# Patient Record
Sex: MALE | Race: WHITE
[De-identification: names, ages, dates, MRNs, and addresses within clinical notes are randomized per-mention and may not be internally consistent; named-entity substitution may affect disease eponyms.]

---

## 2017-05-16 ENCOUNTER — HOSPITAL ENCOUNTER (OUTPATIENT)
Dept: HOSPITAL 45 - C.RDSM | Age: 52
Discharge: HOME | End: 2017-05-16
Attending: INTERNAL MEDICINE
Payer: COMMERCIAL

## 2017-05-16 DIAGNOSIS — M25.562: Primary | ICD-10-CM

## 2017-05-24 ENCOUNTER — HOSPITAL ENCOUNTER (OUTPATIENT)
Dept: HOSPITAL 45 - C.MRI | Age: 52
Discharge: HOME | End: 2017-05-24
Attending: INTERNAL MEDICINE
Payer: COMMERCIAL

## 2017-05-24 DIAGNOSIS — R93.7: ICD-10-CM

## 2017-05-24 DIAGNOSIS — R60.0: ICD-10-CM

## 2017-05-24 DIAGNOSIS — M25.462: ICD-10-CM

## 2017-05-24 DIAGNOSIS — M25.562: Primary | ICD-10-CM

## 2017-05-24 NOTE — DIAGNOSTIC IMAGING REPORT
MRI OF THE LEFT KNEE WITHOUT CONTRAST



CLINICAL HISTORY: Left knee pain.    



COMPARISON STUDY:  Left knee radiograph May 16, 2017.



TECHNIQUE: Utilizing a 1.5 Lima magnet and dedicated coil, multiplanar,

multiecho imaging of the left knee was performed without intravenous or

intraarticular contrast.



FINDINGS: Alignment of the left knee is anatomic. There is a small left knee

joint effusion. Extensor mechanism is intact. The anterior and posterior

cruciate ligaments are intact. The medial collateral ligament and lateral

collateral ligament complex are also intact. There is mild chondrosis within the

medial compartment. There is moderate subchondral signal abnormality within the

mid aspect of the trochlea with overlying chondral signal abnormality. There is

no fracture. No suspicious marrow replacement is present. Lateral meniscus is

intact. There is signal within the posterior horn of the medial meniscus which

likely extends to articular surface. This suggests a meniscal tear.



IMPRESSION:  



1. Increased signal within the posterior horn of the medial meniscus with

probable articular surface extension suggestive of a meniscal tear.



2. Subchondral edema with overlying cartilage thinning within the mid aspect of

the trochlea.



3. Mild chondrosis within the medial compartment.



4. Small left knee joint effusion.







Electronically signed by:  Maxi Rudolph M.D.

5/24/2017 5:31 PM



Dictated Date/Time:  5/24/2017 5:13 PM

## 2018-01-23 ENCOUNTER — HOSPITAL ENCOUNTER (OUTPATIENT)
Dept: HOSPITAL 45 - X.SURG | Age: 53
Discharge: HOME | End: 2018-01-23
Attending: ORTHOPAEDIC SURGERY
Payer: COMMERCIAL

## 2018-01-23 VITALS
BODY MASS INDEX: 22.78 KG/M2 | WEIGHT: 150.31 LBS | HEIGHT: 67.99 IN | BODY MASS INDEX: 22.78 KG/M2 | HEIGHT: 67.99 IN | WEIGHT: 150.31 LBS

## 2018-01-23 VITALS — HEART RATE: 64 BPM | SYSTOLIC BLOOD PRESSURE: 122 MMHG | OXYGEN SATURATION: 98 % | DIASTOLIC BLOOD PRESSURE: 71 MMHG

## 2018-01-23 VITALS — TEMPERATURE: 97.52 F

## 2018-01-23 DIAGNOSIS — Z82.49: ICD-10-CM

## 2018-01-23 DIAGNOSIS — Z98.890: ICD-10-CM

## 2018-01-23 DIAGNOSIS — Z98.818: ICD-10-CM

## 2018-01-23 DIAGNOSIS — M23.232: ICD-10-CM

## 2018-01-23 DIAGNOSIS — Z80.0: ICD-10-CM

## 2018-01-23 DIAGNOSIS — M17.12: Primary | ICD-10-CM

## 2018-01-23 NOTE — [facility] POST OPERATIVE BRIEF NOTE
Immediate Operative Summary


Operative Date


Jan 23, 2018.





Pre-Operative Diagnosis





Left Knee medial menisus tear, chondrosis patellofemoral joint/mfc





Post-Operative Diagnosis





same as preop





Procedure(s) Performed





Left Knee Arthroscopic Partial Medial Meniscectomy, Chondroplasty Patellar 


Femoral Joint, Chondroplasty Femoral Condyle





Surgeon


Dr. Souza





Assistant Surgeon(s)


YADIRA Nash





Estimated Blood Loss


trace





Findings


Consistent with Post-Op Diagnosis


see op note





Fluids (cc crystalloids)


1300cc





Specimens





none per surgeon





Drains


None





Anesthesia Type


General





Complication(s)


none





Disposition


Accompanied Pt To Recover:  no


Disposition:  Recovery Room / PACU

## 2018-01-23 NOTE — OPERATIVE REPORT
DATE OF OPERATION:  01/23/2018

 

SURGEON:  Tommie Souza MD.

 

ASSISTANT:  Mega Bolden PA-C.  No resident or fellow available.

 

PREOPERATIVE DIAGNOSIS:  Degenerative disease, left knee with medial meniscus

tear.

 

POSTOPERATIVE DIAGNOSES:

1.  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

2.  Medial femoral condyle, grade 3 chondrosis.

3.  Root tear medial meniscus with unstable posterior horn and mucoid

degeneration.

 

OPERATION PERFORMED:   Same.

1.  Exam under anesthesia.

2.  Diagnostic arthroscopy.

3.  Arthroscopic partial medial meniscectomy.

4.  Arthroscopic chondroplasty medial femoral condyle.

5.  Arthroscopic chondroplasty patellofemoral joint including full thickness

trochlear lesion 7 mm wide x 15 mm long central zone.  A grade 3 chondrosis

lateral facet patella.

 

PERIOPERATIVE SITUATION:  Medically cleared male with intractable knee pain

who has been identified as having significant meniscal degeneration with

probable root tear as well as significant chondrosis of the patellofemoral

joint, medial femoral condyle.  He was identified at arthroscopic treatment

this would not be guaranteed of any success and he would potentially

continued to have symptoms and escalate the need for needing a knee

replacement.  He understood this risk and benefits and wants to proceed.

 

OPERATION:  After the patient appropriately identified, site verified,

consent verified, the knee was examined revealing no ligamentous instability.

 He was then sterilely injected with 20 mL of 0.5% Marcaine and epinephrine

with 5 mg of Duramorph for postoperative pain control.  The inframedial and

inferolateral portals were then marked  and injected with 3

mL of 0.5% Marcaine with epinephrine.  Inspection of the joint through

inferolateral portal revealed the areas of chondrosis noted on the medial

femoral condyle and the patellofemoral joint and the accessory anteromedial

portal was made with needle localization.  Once this was done, gentle

synovectomy was completed to improve visualization.  The medial meniscus was

then identified and noted to have a significant chronic root tear with

unstable flap.  It was then trimmed to a stable balanced contoured rim.  The

medial compartment had grade 3 articular disease with medial femoral condyle

with a significant flap.  This was debrided to a stable base.  There was no

microfracture performed.

 

It was elected not to do that based on the fact the patient's age, the

patient that he is missing a meniscus and that he had significant patellar

femoral disease.

 

Once the medial meniscus was trimmed and stable and a chondroplasty of the

medial femoral condyle was trimmed and stable, the patellofemoral joint was

addressed working from the anteromedial portal, the central area.  The

trochlea was noted to be full thickness deep and then was easily shaved with

the shaver along its medial side and then using the curette, debrided that

from the lateral aspect and then switching the scope to the medial side was

finally completely debrided.  It was down to bare bone.  Again, it was

elected not to do microfracture based on patient's age and comorbidities of

the medial compartment and the bicompartmental trouble with the

patellofemoral joint.  The patella was then shaved with grade 2-3 changes in

the lateral facet in the central area.  This was done for both the medial and

lateral portals.  The lateral compartment was inspected revealing no

significant disease of the lateral meniscus or the lateral compartment. 

There was some softening of the cartilage; however, this was in the articular

areas.

 

The ACL and PCL were normal by visualization as well as documented by

physical exam.  The knee was then copiously irrigated.  All instruments,

loose debris and fluid removed and then the portals closed with 3-0 nylon,

dressed with Xeroform, 4 x 4 gauze, sterile Webril, ABD pads and above knee

RACHELLE stocking.  Overall prognosis for this knee long-term is guarded,

hopefully we will have some short term success.  If not, we will need to

consider other surgical options including opening wedge high tibial

osteotomy, unicompartmental knee replacement or total knee replacement.  We

would prefer not to do unicompartmental based on patellofemoral disease. 

Estimated blood loss was trace.  Crystalloid 1300 mL.  DVT prophylaxis per

protocol.

 

 

I attest to the content of the Intraoperative Record and any orders documented 
therein. Any exceptions are noted below.

 

 

 

MTDD

## 2018-01-23 NOTE — ANESTHESIA PROGRESS NT - [facility]
Anesthesia Post Op Note


Date & Time


Jan 23, 2018 at 09:53





Vital Signs


Pain Intensity:  2





Vital Signs Past 12 Hours








  Date Time  Temp Pulse Resp B/P (MAP) Pulse Ox O2 Delivery O2 Flow Rate FiO2


 


1/23/18 09:27 36.4 70 18 120/66 (84) 97 Room Air  


 


1/23/18 09:18  72 15  96   


 


1/23/18 09:18 36.6 71 15     


 


1/23/18 09:16    118/62    


 


1/23/18 09:13  68 20     


 


1/23/18 09:13  69 20  99   


 


1/23/18 09:11    119/61    


 


1/23/18 09:08  68 17  99   


 


1/23/18 09:08  67 17     


 


1/23/18 09:06    120/62    


 


1/23/18 09:03  64 11     


 


1/23/18 09:03  63 11  99   


 


1/23/18 09:01    109/60    


 


1/23/18 08:58  67 13     


 


1/23/18 08:58  68 13  100   


 


1/23/18 08:56    115/57    


 


1/23/18 08:53  65 13     


 


1/23/18 08:53  64 13  99   


 


1/23/18 08:51    113/58    


 


1/23/18 08:48  65 19     


 


1/23/18 08:48  65 19  98   


 


1/23/18 08:46    115/56    


 


1/23/18 08:44 36.7 65 16 110/54 97 Mask 6 


 


1/23/18 08:43    110/54    


 


1/23/18 06:43 37.3 63 18 140/84 (102) 96 Room Air  











Notes


Mental Status:  alert / awake / arousable, participated in evaluation


Pt Amnestic to Procedure:  Yes


Nausea / Vomiting:  adequately controlled


Pain:  adequately controlled


Airway Patency, RR, SpO2:  stable & adequate


BP & HR:  stable & adequate


Hydration State:  stable & adequate


Anesthetic Complications:  no major complications apparent

## 2018-01-23 NOTE — DISCHARGE INSTRUCTIONS
Discharge Instructions


Date of Service


Jan 23, 2018.





Visit


Reason for Visit:  Left Knee Medial Meniscus Tear, Chondrosis Patello





Discharge


Discharge Diagnosis / Problem:  same





Discharge Goals


Goal(s):  Decrease discomfort, Improve function, Improve disease control





Medications


Stopped Medications Name(s):  


na


Restart Stopped Medication(s):


use all scripts as directed





Activity Recommendations


Activity Limitations:  as noted below


Lifting Limitations:  until after follow-up appointment


Exercise/Sports Limitations:  until after follow-up appointment


May Resume Sexual Activity:  after follow-up appointment


Shower/Bathe:  keep incision dry


Driving or Machine Use:  resume 1 day after discharge


Weightbearing Status:  Left weightbearing (as tolerated)





Anesthesia


.





Post Anesthesia Instructions:





If you have had General Anesthesia or IV Sedation:





*  Do not drive today.


*  Resume driving when surgeon permits.


*  Do not make important decisions or sign legal documents today.


*  Call surgeon for:





   1.  Temperature elevations greater than 101 degrees F.


   2.  Uncontrollable pain.


   3.  Excessive bleeding.


   4.  Persistent nausea and vomiting.


   5.  Medication intolerance (nausea, vomiting or rash).





*  For nausea and vomiting use only clear liquids such as: tea, soda, bouillon 

until nausea subsides, then gradually increase diet as tolerated.





*  If you have any concerns or questions, call your surgeon's office.  If 

physician is unavailable and it is an emergency, call 911 or go to the nearest 

emergency room.





.





Instructions / Follow-Up


Instructions / Follow-Up


The following are instructions to follow after  your Arthroscopic Knee Surgery.





ACTIVITY RECOMMENDATIONS:





*  Minimize activity until your first visit after surgery.





*  No excessive walking, jogging, sports or laboring.





*  Return to activity is individualized.  Most patients are able to return to 

every day activities


   within one month.





*  Return to sports or intensive labor usually occurs at 2-3 months.





*  Driving is not permitted until at least your first postoperative visit at a 

minimum.  Please 


   ask your doctor when it is safe to resume driving.  If you have an automatic 

vehicle and 


   your left leg has been operated on, then you may begin driving as soon as 

you are 


   comfortable and can drive safely.








SCHOOL/WORK RECOMMENDATIONS:





*  You may return to sedentary work or school when you are feeling more 

comfortable.  This


    is usually 3-7 days after surgery.  





*  Expect increased discomfort with increased activity.  Continue to elevate 

and ice the leg 


   as much as possible.








MEDICATIONS:





*  You will have a prescription for pain medication and an anti-inflammatory 

medication after surgery.





*  Use the pain medication for severe pain and the anti-inflammatory for less 

severe pain.  Once the 


   pain medication has run out, try to use the anti-inflammatory medication.  

If this is not effective, 


   contact the office [phone] for assistance.





*  The pain medication may cause nausea, constipation and drowsiness.  You 

should see how they 


   affect you before driving or similar activity.





*  The anti-inflammatory medication may cause stomach upset and bleeding.  If 

this occurs let your 


   doctor know immediately [phone].  





*  Take a stool softener like Colace or a laxative like Senokot to prevent 

constipation.








DIET:





*  Resume previous diet.








SPECIAL CARE:





ICE:  You have the option of an ice cooler, gel packs or ice bags. 





*   If you have an ice cooler, refer to the instructions for that device.  The 

ice cooler may 


    be used continuously.





*  If you do not have an ice cooler, you will need to use ice bags or gel 

packs.  Do not 


   apply ice directly to the skin.  Use a thin dressing or citlali shirt between 

the skin and ice


   bag.  Apply ice for 20-30 minutes and repeat every 2-4 hours.  This is 

especially important


   for the first 7-10 days after surgery.  Once the pain improves, use ice as 

needed.  





ELEVATION:





*  Keep your leg elevated at or above the level of your heart as much as 

possible.





*  Expect some increased discomfort and swelling if you are standing for any 

length of time.





*  When lying down, avoid placing anything under your knee.  Rather, prop your 

leg up by placing


   several pillows under your heel or calf.





DRESSING:





*  Your dressing will be changed at your first therapy appointment 

approximately 4-5 days after 


   surgery.  Band-aids, tape strips or gauze may be applied.  You may then 

change your dressing


   daily.





*  Reapply dressing followed by the Ace wrap or Tubi- stockinet and EBIce 

cooling pad (if chosen).  





*  Always wash your hands prior to touching the incision area.





*  Once the stitches are removed, you may leave the wound open to air or cover 

with an Ace wrap 


   or Tubi- stockinet.





*  If you have been given a white elastic stocking (RACHELLE hose), wear as much as 

possible for the first


   1-3 weeks depending on swelling.





*  Expect some bloody drainage for the first few days after surgery.





*  Leave the tape strips, if present, in place for 5-7 days.





*  Band-aids and gauze may be changed daily.





CRUTCHES:





*  You will need to use crutches after surgery.





*  You may gradually progress to full weight bearing as tolerated and wean off 

the crutches


   unless otherwise advised.





*  Your therapist can provide assistance weaning off crutches.





*  Patients who have a microfracture done may need to be toe-touch weight-

bearing for 


   4-6 weeks.





BATHING:





*  You may shower or sponge-bathe immediately after surgery.  





*  The dressing will need to be covered with a plastic bag or plastic wrap 

until the dressing


    is changed on the fourth or fifth day after surgery. 





*  Once the dressing has been changed on the fourth or fifth day after surgery, 

you may 


   shower and get the incision wet.





*  Wash with regular soap and water.  





*  Do not bathe (submerge the incision), soak, swim or use a hot tub until the 

incision is 


   completely healed over with normal skin and the doctor has given the OK to 

proceed.





*  There is no need to apply any ointments, powders or salves to your incision.





*  Do not apply alcohol or hydrogen peroxide directly to the incision.





*  Diluted peroxide (50:50 mixture with sterile saline) may be used to clean 

dried blood from


   around the incision area.





BRACE:





*  Bracing is generally not needed after routine Arthroscopic Knee surgery.





THERAPY:





*  You will begin therapy four or five days after surgery.  





*  Organized therapy with the therapist is important for the first 4-6 weeks 

after surgery.  


   During that time you will attend therapy 1-3 times per week.  





*  You will also need to do daily exercises for range of motion and strength as 

instructed.








PROBLEMS/QUESTIONS:





*  If you have any problems such as severe pain, numbness, tingling or high 

fevers or if you 


   have any questions, please contact the office at [phone].





*  It is not uncommon to have some numbness and tingling after the surgery 

especially if you


   have had a nerve block done.  This should gradually improve over the first 1-

2 days.  If this 


   persists longer or worsens please contact the office.








FOLLOW UP VISIT:





*  If not already scheduled, please call the office at [phone] to 

schedule a follow-up


   appointment for 10 days, 6 weeks and 3 months after surgery.





Diet Recommendations


Recommended Home Diet:  resume previous diet





Procedures


Procedures Performed:  


see op note





Pending Studies


Studies pending at discharge:  no





Medical Emergencies








.


Who to Call and When:





Medical Emergencies:  If at any time you feel your situation is an emergency, 

please call 911 immediately.





.





Non-Emergent Contact


Non-Emergency issues call your:  Specialist


Call Non-Emergent contact if:  temperature is above 101.5, wound has increased 

drainage, wound has increased redness, wound has increased pain





.


.








"Provider Documentation" section prepared by Tommie Souza.








.

## 2018-01-23 NOTE — HISTORY & PHYSICAL BRIDGE NOTE
H&P Re-Evaluation


Bridge Note:


I have examined the patient, reviewed the History & Physical and in the 

interval since the performance of the History & Physical I have noted the 

following changes of clinical significance:consent obtained. No changes noted

## 2018-01-23 NOTE — [facility] OPERATIVE REPORT
Operative Report


Operative Date


Jan 23, 2018.





Pre-Operative Diagnosis





Left Knee medial menisus tear, chondrosis patellofemoral joint/mfc





Post-Operative Diagnosis





Left knee same as preop





Procedure(s) Performed





Left Knee Arthroscopic Partial Medial Meniscectomy, Chondroplasty Patellar 


Femoral Joint, Chondroplasty Femoral Condyle





Surgeon


Dr. Souza





Assistant Surgeon(s)


YADIRA Nash





Estimated Blood Loss


trace





Findings


medial meniscal tear, djd medial femoral condyle and trochlea





Fluids (cc crystalloids)


1300cc





Specimens





none per surgeon





Drains


none





Complication(s)


None





Disposition


Recovery Room / PACU





Indications


This 52-year-old white male presented the office complaints of left knee pain 

that was not relieved with conservative care measures.  Preoperative imaging 

was obtained.  The option of surgical intervention was discussed by Dr. Souza along with the risks and benefits.  He elected to proceed with 

surgical intervention in hopes of alleviating his discomfort.





Description of Procedure


Patient was taken to the operating room or he was given general anesthesia.  He 

was prepped and draped in usual sterile fashion.  Please see Dr. Souza'

s operative report for specifics of the procedure.  I was present for the 

entire case from initial patient positioning through final wound closure.  

Assistance was provided in patient positioning, arthroscopy, and final wound 

closure.  Patient was taken to the recovery room in satisfactory condition.


I attest to the content of the Intraoperative Record and any orders documented 

therein.  Any exceptions are noted below.